# Patient Record
Sex: MALE | Race: AMERICAN INDIAN OR ALASKA NATIVE
[De-identification: names, ages, dates, MRNs, and addresses within clinical notes are randomized per-mention and may not be internally consistent; named-entity substitution may affect disease eponyms.]

---

## 2018-05-07 ENCOUNTER — HOSPITAL ENCOUNTER (EMERGENCY)
Dept: HOSPITAL 5 - ED | Age: 33
Discharge: HOME | End: 2018-05-07
Payer: COMMERCIAL

## 2018-05-07 VITALS — DIASTOLIC BLOOD PRESSURE: 93 MMHG | SYSTOLIC BLOOD PRESSURE: 140 MMHG

## 2018-05-07 DIAGNOSIS — R07.89: Primary | ICD-10-CM

## 2018-05-07 DIAGNOSIS — Z86.711: ICD-10-CM

## 2018-05-07 DIAGNOSIS — Z86.718: ICD-10-CM

## 2018-05-07 DIAGNOSIS — Z88.0: ICD-10-CM

## 2018-05-07 LAB
APTT BLD: 29 SEC. (ref 24.2–36.6)
BASOPHILS # (AUTO): 0.1 K/MM3 (ref 0–0.1)
BASOPHILS NFR BLD AUTO: 1 % (ref 0–1.8)
BUN SERPL-MCNC: 13 MG/DL (ref 9–20)
BUN/CREAT SERPL: 12 %
CALCIUM SERPL-MCNC: 9.1 MG/DL (ref 8.4–10.2)
EOSINOPHIL # BLD AUTO: 0.1 K/MM3 (ref 0–0.4)
EOSINOPHIL NFR BLD AUTO: 2 % (ref 0–4.3)
HCT VFR BLD CALC: 48.4 % (ref 35.5–45.6)
HEMOLYSIS INDEX: 31
HGB BLD-MCNC: 16.3 GM/DL (ref 11.8–15.2)
INR PPP: 0.95 (ref 0.87–1.13)
LYMPHOCYTES # BLD AUTO: 2 K/MM3 (ref 1.2–5.4)
LYMPHOCYTES NFR BLD AUTO: 38.8 % (ref 13.4–35)
MCH RBC QN AUTO: 29 PG (ref 28–32)
MCHC RBC AUTO-ENTMCNC: 34 % (ref 32–34)
MCV RBC AUTO: 84 FL (ref 84–94)
MONOCYTES # (AUTO): 0.5 K/MM3 (ref 0–0.8)
MONOCYTES % (AUTO): 10.5 % (ref 0–7.3)
PLATELET # BLD: 205 K/MM3 (ref 140–440)
RBC # BLD AUTO: 5.73 M/MM3 (ref 3.65–5.03)

## 2018-05-07 PROCEDURE — 85379 FIBRIN DEGRADATION QUANT: CPT

## 2018-05-07 PROCEDURE — 80048 BASIC METABOLIC PNL TOTAL CA: CPT

## 2018-05-07 PROCEDURE — 99284 EMERGENCY DEPT VISIT MOD MDM: CPT

## 2018-05-07 PROCEDURE — 93010 ELECTROCARDIOGRAM REPORT: CPT

## 2018-05-07 PROCEDURE — 85730 THROMBOPLASTIN TIME PARTIAL: CPT

## 2018-05-07 PROCEDURE — 84484 ASSAY OF TROPONIN QUANT: CPT

## 2018-05-07 PROCEDURE — 85610 PROTHROMBIN TIME: CPT

## 2018-05-07 PROCEDURE — 36415 COLL VENOUS BLD VENIPUNCTURE: CPT

## 2018-05-07 PROCEDURE — 85025 COMPLETE CBC W/AUTO DIFF WBC: CPT

## 2018-05-07 PROCEDURE — 71046 X-RAY EXAM CHEST 2 VIEWS: CPT

## 2018-05-07 PROCEDURE — 93005 ELECTROCARDIOGRAM TRACING: CPT

## 2018-05-07 NOTE — EMERGENCY DEPARTMENT REPORT
Blank Doc





- Documentation


Documentation: 





Patient is a 32-year-old -American male with a past smoker history of PE 

who was taken off of his blood tenderness over a year ago coming in with chest 

pain.  Patient states that also last year he sometimes gets chest pain with 

exertion while playing basketball I Sit down be short of breath.  Patient 

states yesterday at rest the patient had some sharp chest pain or shortness of 

breath.  Patient states he normally does not get chest pain with rest.  The 

patient denies any cough nausea vomiting diarrhea at this time.  Patient states 

that his chest discomfort has persisted since yesterday.  Patient states this 

is 6 out of 10 in severity.


Patient will be moved to the main area for serial cardiac enzymes chest x-ray 

and d-dim and further assessment of his chest pain.  Er

## 2018-05-07 NOTE — EMERGENCY DEPARTMENT REPORT
ED Chest Pain HPI





- General


Chief Complaint: Chest Pain


Stated Complaint: CHEST PAIN


Time Seen by Provider: 05/07/18 10:54


Source: patient, old records reviewed (no previous rec)


Mode of arrival: Ambulatory


Limitations: No Limitations





- History of Present Illness


Initial Comments: 





32-year-old male with a past medical history PE/DVT related to driving for work 

presents to the hospital complaining of chest pain and shortness of breath.  

Patient states he has been having intermittent sharp chest pains that vary from 

the left side of his chest to his mid and right-sided chest times one years.  

Exacerbated with movement.  Denies pain with inspiration.  Abdomen night.  She 

had episode refill his chest was tight and he couldn't breathe.  Patient denies 

nausea, vomiting, calf tenderness, or edema.  His blood thinners were 

discontinued one year ago.  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 

CINDY.  He states he is advised to take an aspirin daily after anticoagulation 

discontinued.  Denies known immediate family history of CAD, smoking, cocaine 

use, hypertension, diabetes,or high cholesterol.





- Related Data


 Allergies











Allergy/AdvReac Type Severity Reaction Status Date / Time


 


Penicillins Allergy  Hives Verified 05/07/18 09:36














Heart Score





- HEART Score


History: Slightly suspicious


EKG: Normal


Age: < 45


Risk factors: No known risk factors


Troponin: < normal limit


HEART Score: 0





ED Review of Systems


ROS: 


Stated complaint: CHEST PAIN


Other details as noted in HPI





Comment: All other systems reviewed and negative





ED Past Medical Hx





- Past Medical History


Previous Medical History?: Yes


Hx Deep Vein Thrombosis: Yes


Hx Pulmonary Embolism: Yes





- Surgical History


Past Surgical History?: No





- Social History


Smoking Status: Never Smoker


Substance Use Type: Alcohol





ED Physical Exam





- General


Limitations: No Limitations





- Other


Other exam information: 





General: No limitations, patient is alert in no acute distress


Head exam: Atraumatic, normocephalic


Eyes exam: Normal appearance, pupils equal reactive to light, extraocular 

movements intact


ENT: Moist mucous membrane, normal oropharynx


Neck exam: Normal inspection, full range of motion, no meningismus nontender


Respiratory exam: Clear to auscultation bilateral, no wheezes, rales, crackles


Cardiovascular: Normal rate and rhythm, normal heart sounds


Abdomen: Soft, nondistended, and  nontender, with normal bowel sounds, no 

rebound, or guarding


Extremity: Full range of motion normal inspection no deformity, no calf 

tenderness or edema


Back: Normal Inspection, full range of motion, no tenderness


Neurologic: Alert, oriented x3, cranial nerves intact, no motor or sensory 

deficit


Psychiatric: normal affect, normal mood


Skin: Warm, dry, intact





ED Course


 Vital Signs











  05/07/18 05/07/18





  09:36 11:19


 


Temperature 98.6 F 


 


Pulse Rate 81 


 


Respiratory 18 18





Rate  


 


Blood Pressure 140/93 


 


O2 Sat by Pulse 96 99





Oximetry  














AMY score





- Amy Score


Age > 65: (0) No


Aspirin use within the Past 7 Days: (0) No


3 or more CAD Risk Factors: (0) No


2 or more Angina events in past 24 hrs: (0) No


Known CAD with more than 50% Stenosis: (0) No


Elevated Cardiac Markers: (0) No


ST Deviation Greater than 0.5mm: (0) No


AMY Score: 0





ED Medical Decision Making





- Lab Data


Result diagrams: 


 05/07/18 11:38





 05/07/18 11:38








 Lab Results











  05/07/18 05/07/18 05/07/18 Range/Units





  11:38 11:38 11:38 


 


WBC  5.2    (4.5-11.0)  K/mm3


 


RBC  5.73 H    (3.65-5.03)  M/mm3


 


Hgb  16.3 H    (11.8-15.2)  gm/dl


 


Hct  48.4 H    (35.5-45.6)  %


 


MCV  84    (84-94)  fl


 


MCH  29    (28-32)  pg


 


MCHC  34    (32-34)  %


 


RDW  14.3    (13.2-15.2)  %


 


Plt Count  205    (140-440)  K/mm3


 


Lymph % (Auto)  38.8 H    (13.4-35.0)  %


 


Mono % (Auto)  10.5 H    (0.0-7.3)  %


 


Eos % (Auto)  2.0    (0.0-4.3)  %


 


Baso % (Auto)  1.0    (0.0-1.8)  %


 


Lymph #  2.0    (1.2-5.4)  K/mm3


 


Mono #  0.5    (0.0-0.8)  K/mm3


 


Eos #  0.1    (0.0-0.4)  K/mm3


 


Baso #  0.1    (0.0-0.1)  K/mm3


 


Seg Neutrophils %  47.7    (40.0-70.0)  %


 


Seg Neutrophils #  2.5    (1.8-7.7)  K/mm3


 


PT   13.2   (12.2-14.9)  Sec.


 


INR   0.95   (0.87-1.13)  


 


APTT   29.0   (24.2-36.6)  Sec.


 


D-Dimer   < 135.00   (0-234)  ng/mlDDU


 


Sodium    140  (137-145)  mmol/L


 


Potassium    4.6  (3.6-5.0)  mmol/L


 


Chloride    102.9  ()  mmol/L


 


Carbon Dioxide    27  (22-30)  mmol/L


 


Anion Gap    15  mmol/L


 


BUN    13  (9-20)  mg/dL


 


Creatinine    1.1  (0.8-1.5)  mg/dL


 


Estimated GFR    > 60  ml/min


 


BUN/Creatinine Ratio    12  %


 


Glucose    71 L  ()  mg/dL


 


Calcium    9.1  (8.4-10.2)  mg/dL


 


Troponin T    < 0.010  (0.00-0.029)  ng/mL














- EKG Data


-: EKG Interpreted by Me


EKG shows normal: sinus rhythm, axis (qrs -25), QRS complexes (qrsd 106), ST-T 

waves (no stemi/t inv)


Rate: normal (69)





- EKG Data


When compared to previous EKG there are: previous EKG unavailable





- Radiology Data


Radiology results: image reviewed (cxr pa and lat: naf read by me)





- Medical Decision Making





wells PE criteria score


1.5 points


Low risk group: 1.3% chance of PE in an ED population. 





Another study assigned scores ? 4 as PE Unlikely and had a 3% incidence of PE.


(points for previous PE) 





ddimer neg so low risk for PE





Vitals signs and symptoms stable and patient without distress in the ED.  Will 

be discharged home with PMD follow


Critical Care Time: No


Critical care attestation.: 


If time is entered above; I have spent that time in minutes in the direct care 

of this critically ill patient, excluding procedure time.








ED Disposition


Clinical Impression: 


 Atypical chest pain





Disposition: DC-01 TO HOME OR SELFCARE


Is pt being admited?: No


Does the pt Need Aspirin: No


Condition: Stable


Instructions:  Chest Pain (ED)


Additional Instructions: 


Take Motrin or Tylenol as needed for pain.  Follow with the primary care doctor 

or clinic provided for further evaluation.  Return if symptoms worsen as 

indicated by your discharge instructions


Referrals: 


VIANNEY LEAL MD [Staff Physician] - 3-5 Days (Primary care doctor)


[facility] [Provider Group] - 3-5 Days (Primary care clinic)


Time of Disposition: 12:55